# Patient Record
Sex: FEMALE | Race: WHITE | Employment: FULL TIME | URBAN - METROPOLITAN AREA
[De-identification: names, ages, dates, MRNs, and addresses within clinical notes are randomized per-mention and may not be internally consistent; named-entity substitution may affect disease eponyms.]

---

## 2019-04-20 ENCOUNTER — HOSPITAL ENCOUNTER (EMERGENCY)
Facility: HOSPITAL | Age: 34
Discharge: HOME | End: 2019-04-20
Attending: EMERGENCY MEDICINE
Payer: OTHER MISCELLANEOUS

## 2019-04-20 VITALS
DIASTOLIC BLOOD PRESSURE: 86 MMHG | SYSTOLIC BLOOD PRESSURE: 122 MMHG | HEART RATE: 87 BPM | TEMPERATURE: 98 F | RESPIRATION RATE: 18 BRPM | OXYGEN SATURATION: 100 %
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
## 2019-04-20 DIAGNOSIS — S61.259A DOG BITE OF FINGER, INITIAL ENCOUNTER: Primary | ICD-10-CM

## 2019-04-20 DIAGNOSIS — S61.239A PUNCTURE WOUND OF FINGER OF LEFT HAND, INITIAL ENCOUNTER: ICD-10-CM

## 2019-04-20 PROCEDURE — 99283 EMERGENCY DEPT VISIT LOW MDM: CPT

## 2019-04-20 PROCEDURE — 90471 IMMUNIZATION ADMIN: CPT

## 2019-04-20 RX ORDER — AMOXICILLIN AND CLAVULANATE POTASSIUM 875; 125 MG/1; MG/1
1 TABLET, FILM COATED ORAL 2 TIMES DAILY
Qty: 20 TABLET | Refills: 0 | Status: SHIPPED | OUTPATIENT
Start: 2019-04-20 | End: 2019-04-30

## 2019-04-20 NOTE — ED INITIAL ASSESSMENT (HPI)
Reports while cleaning a dogs teeth under anesthesia, the dog woke up and bit pt. L index finger puncture/abrasion noted. Pt not UTD on tetanus.

## 2019-04-20 NOTE — ED PROVIDER NOTES
Patient Seen in: [facility] Emergency Department    History   Patient presents with:  Bite (integumentary)    Stated Complaint: dog bite    INES    Vitor Everett is a 23-year-old  who presents for evaluation of a dog bite.   She was cleaning a dog t murmurs. ABDOMEN: Soft, nontender, nondistended with good bowel sounds. There is no hepatosplenomegaly and no masses. EXTREMITIES: On examination of the left fourth finger, she has 2 puncture marks over the distal interphalangeal joint.   The puncture

## 2022-04-30 ENCOUNTER — APPOINTMENT (OUTPATIENT)
Dept: CT IMAGING | Facility: HOSPITAL | Age: 37
End: 2022-04-30
Attending: EMERGENCY MEDICINE
Payer: OTHER MISCELLANEOUS

## 2022-04-30 ENCOUNTER — HOSPITAL ENCOUNTER (EMERGENCY)
Facility: HOSPITAL | Age: 37
Discharge: HOME | End: 2022-04-30
Attending: EMERGENCY MEDICINE
Payer: OTHER MISCELLANEOUS

## 2022-04-30 VITALS
HEART RATE: 83 BPM | OXYGEN SATURATION: 98 % | TEMPERATURE: 98 F | RESPIRATION RATE: 18 BRPM | DIASTOLIC BLOOD PRESSURE: 93 MMHG | SYSTOLIC BLOOD PRESSURE: 136 MMHG

## 2022-04-30 DIAGNOSIS — S09.90XA MINOR HEAD INJURY, INITIAL ENCOUNTER: Primary | ICD-10-CM

## 2022-04-30 LAB — B-HCG UR QL: NEGATIVE

## 2022-04-30 PROCEDURE — 99284 EMERGENCY DEPT VISIT MOD MDM: CPT

## 2022-04-30 PROCEDURE — 70450 CT HEAD/BRAIN W/O DYE: CPT | Performed by: EMERGENCY MEDICINE

## 2022-04-30 PROCEDURE — 81025 URINE PREGNANCY TEST: CPT

## 2022-04-30 RX ORDER — ONDANSETRON 4 MG/1
4 TABLET, ORALLY DISINTEGRATING ORAL EVERY 4 HOURS PRN
Qty: 10 TABLET | Refills: 0 | Status: SHIPPED | OUTPATIENT
Start: 2022-04-30 | End: 2022-05-07

## 2024-12-24 ENCOUNTER — APPOINTMENT (OUTPATIENT)
Dept: GENERAL RADIOLOGY | Age: 39
End: 2024-12-24
Attending: NURSE PRACTITIONER
Payer: OTHER MISCELLANEOUS

## 2024-12-24 ENCOUNTER — HOSPITAL ENCOUNTER (OUTPATIENT)
Age: 39
Discharge: HOME | End: 2024-12-24
Payer: OTHER MISCELLANEOUS

## 2024-12-24 VITALS
SYSTOLIC BLOOD PRESSURE: 140 MMHG | DIASTOLIC BLOOD PRESSURE: 84 MMHG | OXYGEN SATURATION: 100 % | HEART RATE: 106 BPM | TEMPERATURE: 98 F | RESPIRATION RATE: 20 BRPM
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
## 2024-12-24 DIAGNOSIS — S61.052A DOG BITE OF LEFT THUMB, INITIAL ENCOUNTER: Primary | ICD-10-CM

## 2024-12-24 PROCEDURE — 99203 OFFICE O/P NEW LOW 30 MIN: CPT | Performed by: NURSE PRACTITIONER

## 2024-12-24 PROCEDURE — 73130 X-RAY EXAM OF HAND: CPT | Performed by: NURSE PRACTITIONER

## 2024-12-24 RX ORDER — ACETAMINOPHEN 325 MG/1
650 TABLET ORAL ONCE
Status: COMPLETED | OUTPATIENT
Start: 2024-12-24 | End: 2024-12-24

## 2024-12-24 RX ORDER — FLUCONAZOLE 150 MG/1
150 TABLET ORAL ONCE
Qty: 2 TABLET | Refills: 0 | Status: SHIPPED | OUTPATIENT
Start: 2024-12-24 | End: 2024-12-24

## 2024-12-24 NOTE — ED PROVIDER NOTES
Patient Seen in: [facility]      History   No chief complaint on file.    Stated Complaint: dog bite on thumb    Subjective:   39-year-old female presents with complaint of dog bite to the left thumb that occurred today at work.  Patient works as a  and was bit by a Rottweiler that is fully UTD on vaccines.   Pt cleaned and dressed the wound before coming to IC.   Pt is UTD on tetanus.     Denies numbness, tingling.     The history is provided by the patient.           Objective:     Past Medical History:    Environmental allergies    Migraines              Past Surgical History:   Procedure Laterality Date    Knee arthroscp harv  right knee scopes x3    Three scopes 2007, 2008, and 2012    Other      L SHOULDER AND R KNEE X 3                Social History     Socioeconomic History    Marital status:    Tobacco Use    Smoking status: Every Day     Current packs/day: 0.50     Average packs/day: 0.5 packs/day for 10.0 years (5.0 ttl pk-yrs)     Types: Cigarettes   Vaping Use    Vaping status: Former   Substance and Sexual Activity    Alcohol use: Never    Drug use: Never   Social History Narrative    ** Merged History Encounter **                   Review of Systems   Constitutional:  Negative for chills and fever.   Skin:  Positive for wound.       Positive for stated complaint: dog bite on thumb  Other systems are as noted in HPI.  Constitutional and vital signs reviewed.      All other systems reviewed and negative except as noted above.    Physical Exam     ED Triage Vitals [12/24/24 0918]   /84   Pulse 106   Resp 20   Temp 98.2 °F (36.8 °C)   Temp src Oral   SpO2 100 %   O2 Device None (Room air)       Current Vitals:   Vital Signs  BP: 140/84  Pulse: 106  Resp: 20  Temp: 98.2 °F (36.8 °C)  Temp src: Oral    Oxygen Therapy  SpO2: 100 %  O2 Device: None (Room air)        Physical Exam  Vitals and nursing note reviewed.   Constitutional:       General: She is not in  acute distress.     Appearance: Normal appearance. She is not ill-appearing, toxic-appearing or diaphoretic.   HENT:      Head: Normocephalic and atraumatic.   Eyes:      Conjunctiva/sclera: Conjunctivae normal.   Musculoskeletal:      Left hand: Laceration (puncture wound over base of metacarpal area and dorsal distal phalanx area) and tenderness present. No swelling. Normal strength. Normal sensation. There is no disruption of two-point discrimination. Normal capillary refill. Normal pulse.   Skin:     General: Skin is warm and dry.      Findings: Wound (puncture wound over base of metacarpal area and dorsal distal phalanx area) present.   Neurological:      Mental Status: She is alert.   Psychiatric:         Mood and Affect: Mood normal.             ED Course   Labs Reviewed - No data to display  XR HAND (MIN 3 VIEWS), LEFT (CPT=73130)          PROCEDURE:  XR HAND (MIN 3 VIEWS), LEFT (CPT=73130)     TECHNIQUE:  Three views of the left hand were obtained.      COMPARISON:  None.     INDICATIONS:  dog bite near left thumb base     PATIENT STATED HISTORY: (As transcribed by Technologist)  Dog bite at the base of the thumb.         FINDINGS:  Joint spaces are normal.  No acute fracture or dislocation.  No radiopaque foreign body in the soft tissues.                         =====  CONCLUSION:  Negative for acute fracture.        LOCATION:  [facility]        Dictated by (CST): Mohsen Whelan MD on 12/24/2024 at 10:21 AM       Finalized by (CST): Mohsen Whelan MD on 12/24/2024 at 10:22 AM                   MDM        Medical Decision Making  39-year-old female presents with complaint of dog bite to the left thumb that occurred today at work.  Patient works as a  and was bit by a Rottweiler (dog UTD on vaccines).   Differential diagnoses include foreign body, wound, fracture.  On exam patient is well-appearing with normal vitals.  X-ray does not reveal any foreign body retention and/or fracture.  Last Tdap 2019.  Will  treat with Augmentin x 7 days, push fluids.  Take a probiotic in between doses.  Wound care discussed.  ED precautions emphasized for fever, chills, wound infection, proximal streaking.  Patient understands and is agreeable to plan.    Amount and/or Complexity of Data Reviewed  External Data Reviewed: notes.  Radiology: ordered.    Risk  OTC drugs.  Prescription drug management.        Disposition and Plan     Clinical Impression:  1. Dog bite of left thumb, initial encounter         Disposition:  Discharge  12/24/2024 10:40 am    Follow-up:  Rita Rowland MD  [address] [place] [address]  [phone]      If symptoms worsen          Medications Prescribed:  Current Discharge Medication List        START taking these medications    Details   amoxicillin clavulanate 875-125 MG Oral Tab Take 1 tablet by mouth 2 (two) times daily for 7 days.  Qty: 14 tablet, Refills: 0    Associated Diagnoses: Dog bite of left thumb, initial encounter                 Supplementary Documentation:

## 2024-12-24 NOTE — DISCHARGE INSTRUCTIONS
Take Augmentin: 1 tab twice a day for 7 days.     Keep wound covered for the next 3 days.    If any fever, chills, or a red line traveling up the arm, go to the Emergency room.

## (undated) NOTE — ED AVS SNAPSHOT
Nav ChunMadisyn   MRN: [id number]    Department:  [facility] Emergency Department   Date of Visit:  4/20/2019           Disclosure     Insurance plans vary and the physician(s) referred by the ER may not be covered by your plan.  Sudhakar tell this physician (or your personal doctor if your instructions are to return to your personal doctor) about any new or lasting problems. The primary care or specialist physician will see patients referred from the [facility] Emergency Department.  Levon Maxwell